# Patient Record
Sex: FEMALE | Race: WHITE | URBAN - METROPOLITAN AREA
[De-identification: names, ages, dates, MRNs, and addresses within clinical notes are randomized per-mention and may not be internally consistent; named-entity substitution may affect disease eponyms.]

---

## 2017-01-06 ENCOUNTER — IMPORTED ENCOUNTER (OUTPATIENT)
Dept: URBAN - METROPOLITAN AREA CLINIC 50 | Facility: CLINIC | Age: 72
End: 2017-01-06

## 2017-01-11 ENCOUNTER — IMPORTED ENCOUNTER (OUTPATIENT)
Dept: URBAN - METROPOLITAN AREA CLINIC 50 | Facility: CLINIC | Age: 72
End: 2017-01-11

## 2018-05-25 ENCOUNTER — IMPORTED ENCOUNTER (OUTPATIENT)
Dept: URBAN - METROPOLITAN AREA CLINIC 50 | Facility: CLINIC | Age: 73
End: 2018-05-25

## 2018-05-30 ENCOUNTER — IMPORTED ENCOUNTER (OUTPATIENT)
Dept: URBAN - METROPOLITAN AREA CLINIC 50 | Facility: CLINIC | Age: 73
End: 2018-05-30

## 2018-06-05 ENCOUNTER — IMPORTED ENCOUNTER (OUTPATIENT)
Dept: URBAN - METROPOLITAN AREA CLINIC 50 | Facility: CLINIC | Age: 73
End: 2018-06-05

## 2018-07-02 ENCOUNTER — IMPORTED ENCOUNTER (OUTPATIENT)
Dept: URBAN - METROPOLITAN AREA CLINIC 50 | Facility: CLINIC | Age: 73
End: 2018-07-02

## 2018-07-18 ENCOUNTER — IMPORTED ENCOUNTER (OUTPATIENT)
Dept: URBAN - METROPOLITAN AREA CLINIC 50 | Facility: CLINIC | Age: 73
End: 2018-07-18

## 2018-07-18 NOTE — PATIENT DISCUSSION
"""S/P IOL OS: Sensar AAB00 16.5 +TM. Continue post operative instructions and drops per schedule.  """

## 2018-07-27 ENCOUNTER — IMPORTED ENCOUNTER (OUTPATIENT)
Dept: URBAN - METROPOLITAN AREA CLINIC 50 | Facility: CLINIC | Age: 73
End: 2018-07-27

## 2018-08-06 ENCOUNTER — IMPORTED ENCOUNTER (OUTPATIENT)
Dept: URBAN - METROPOLITAN AREA CLINIC 50 | Facility: CLINIC | Age: 73
End: 2018-08-06

## 2018-08-15 ENCOUNTER — IMPORTED ENCOUNTER (OUTPATIENT)
Dept: URBAN - METROPOLITAN AREA CLINIC 50 | Facility: CLINIC | Age: 73
End: 2018-08-15

## 2018-08-15 NOTE — PATIENT DISCUSSION
"""S/P IOL OD: Sensar AAB00 17 +TM. Continue post operative instructions and drops per schedule.  """

## 2018-08-24 ENCOUNTER — IMPORTED ENCOUNTER (OUTPATIENT)
Dept: URBAN - METROPOLITAN AREA CLINIC 50 | Facility: CLINIC | Age: 73
End: 2018-08-24

## 2018-09-10 ENCOUNTER — IMPORTED ENCOUNTER (OUTPATIENT)
Dept: URBAN - METROPOLITAN AREA CLINIC 50 | Facility: CLINIC | Age: 73
End: 2018-09-10

## 2019-01-25 ENCOUNTER — IMPORTED ENCOUNTER (OUTPATIENT)
Dept: URBAN - METROPOLITAN AREA CLINIC 50 | Facility: CLINIC | Age: 74
End: 2019-01-25

## 2019-02-25 ENCOUNTER — IMPORTED ENCOUNTER (OUTPATIENT)
Dept: URBAN - METROPOLITAN AREA CLINIC 50 | Facility: CLINIC | Age: 74
End: 2019-02-25

## 2020-10-08 ENCOUNTER — IMPORTED ENCOUNTER (OUTPATIENT)
Dept: URBAN - METROPOLITAN AREA CLINIC 50 | Facility: CLINIC | Age: 75
End: 2020-10-08

## 2020-10-08 NOTE — PATIENT DISCUSSION
"""Restart Restasis both eyes twice a day. Increase Artificial tears both eyes two - four times a day.  ""

## 2021-04-17 ASSESSMENT — VISUAL ACUITY
OS_SC: 20/20
OD_CC: J1+
OD_SC: 20/400
OD_OTHER: 20/80.
OS_OTHER: 20/40. 20/60.
OS_SC: 20/20
OS_SC: 20/25
OS_SC: 20/200
OS_OTHER: >20/400.
OD_CC: 20/40
OS_SC: 20/20
OD_CC: 20/70
OD_SC: 20/400
OS_SC: 20/25
OD_BAT: 20/60
OD_OTHER: 20/60. 20/80.
OD_SC: 20/25+
OS_CC: 20/70
OS_SC: 20/20-1
OS_BAT: 20/40
OD_CC: J1+
OD_BAT: 20/60
OD_OTHER: 20/80.
OD_SC: 20/20-1
OD_CC: J1+
OD_SC: 20/20
OD_OTHER: 20/60. 20/70.
OS_BAT: 20/50
OS_CC: J1+
OS_OTHER: 20/20.
OS_OTHER: 20/50. 20/70.
OS_CC: 20/60-1
OS_CC: J1+
OS_CC: J1+
OS_OTHER: >20/400.
OD_CC: J1+
OS_CC: J1+
OS_CC: 20/30-
OD_SC: 20/200
OD_SC: 20/20
OD_SC: 20/25
OS_SC: 20/25

## 2021-04-17 ASSESSMENT — TONOMETRY
OS_IOP_MMHG: 16
OD_IOP_MMHG: 12
OS_IOP_MMHG: 18
OS_IOP_MMHG: 19
OS_IOP_MMHG: 50
OD_IOP_MMHG: 14
OD_IOP_MMHG: 18
OD_IOP_MMHG: 13
OS_IOP_MMHG: 15
OS_IOP_MMHG: 13
OS_IOP_MMHG: 14
OS_IOP_MMHG: 20
OD_IOP_MMHG: 16
OD_IOP_MMHG: 20
OS_IOP_MMHG: 19
OD_IOP_MMHG: 40
OD_IOP_MMHG: 18
OD_IOP_MMHG: 13
OS_IOP_MMHG: 12
OS_IOP_MMHG: 14
OS_IOP_MMHG: 17

## 2024-09-20 ENCOUNTER — PREPPED CHART (OUTPATIENT)
Dept: URBAN - METROPOLITAN AREA CLINIC 53 | Facility: CLINIC | Age: 79
End: 2024-09-20

## 2024-09-23 ENCOUNTER — NEW PATIENT (OUTPATIENT)
Dept: URBAN - METROPOLITAN AREA CLINIC 53 | Facility: CLINIC | Age: 79
End: 2024-09-23

## 2024-09-23 DIAGNOSIS — H16.223: ICD-10-CM

## 2024-09-23 DIAGNOSIS — H43.813: ICD-10-CM

## 2024-09-23 DIAGNOSIS — H40.052: ICD-10-CM

## 2024-09-23 DIAGNOSIS — H26.492: ICD-10-CM

## 2024-09-23 DIAGNOSIS — H52.4: ICD-10-CM

## 2024-09-23 PROCEDURE — 92015 DETERMINE REFRACTIVE STATE: CPT

## 2024-09-23 PROCEDURE — 92133 CPTRZD OPH DX IMG PST SGM ON: CPT

## 2024-09-23 PROCEDURE — 99204 OFFICE O/P NEW MOD 45 MIN: CPT

## 2024-09-23 PROCEDURE — 76514 ECHO EXAM OF EYE THICKNESS: CPT

## 2024-09-23 RX ORDER — CYCLOSPORINE 0.5 MG/ML: 1 EMULSION OPHTHALMIC TWICE A DAY
